# Patient Record
Sex: FEMALE | Race: WHITE | HISPANIC OR LATINO | Employment: STUDENT | RURAL
[De-identification: names, ages, dates, MRNs, and addresses within clinical notes are randomized per-mention and may not be internally consistent; named-entity substitution may affect disease eponyms.]

---
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
## 2024-06-29 PROCEDURE — 93005 ELECTROCARDIOGRAM TRACING: CPT | Performed by: EMERGENCY MEDICINE

## 2024-06-29 PROCEDURE — 99284 EMERGENCY DEPT VISIT MOD MDM: CPT

## 2024-06-29 PROCEDURE — 93005 ELECTROCARDIOGRAM TRACING: CPT

## 2024-06-30 ENCOUNTER — HOSPITAL ENCOUNTER (EMERGENCY)
Facility: HOSPITAL | Age: 19
Discharge: HOME | End: 2024-06-30
Attending: EMERGENCY MEDICINE | Admitting: EMERGENCY MEDICINE
Payer: MEDICAID

## 2024-06-30 ENCOUNTER — APPOINTMENT (OUTPATIENT)
Dept: RESPIRATORY THERAPY | Facility: HOSPITAL | Age: 19
End: 2024-06-30
Payer: MEDICAID

## 2024-06-30 ENCOUNTER — APPOINTMENT (OUTPATIENT)
Dept: GENERAL RADIOLOGY | Facility: HOSPITAL | Age: 19
End: 2024-06-30
Payer: MEDICAID

## 2024-06-30 VITALS
BODY MASS INDEX: 19.99 KG/M2 | SYSTOLIC BLOOD PRESSURE: 111 MMHG | RESPIRATION RATE: 24 BRPM | WEIGHT: 120 LBS | TEMPERATURE: 98.1 F | OXYGEN SATURATION: 96 % | HEIGHT: 65 IN | HEART RATE: 78 BPM | DIASTOLIC BLOOD PRESSURE: 72 MMHG

## 2024-06-30 DIAGNOSIS — R79.89 TSH ELEVATION: ICD-10-CM

## 2024-06-30 DIAGNOSIS — R00.2 PALPITATIONS: Primary | ICD-10-CM

## 2024-06-30 LAB
ALBUMIN SERPL-MCNC: 4.8 G/DL (ref 3.5–5.2)
ALBUMIN/GLOB SERPL: 1.7 G/DL
ALP SERPL-CCNC: 50 U/L (ref 39–117)
ALT SERPL W P-5'-P-CCNC: 19 U/L (ref 1–33)
ANION GAP SERPL CALCULATED.3IONS-SCNC: 13.2 MMOL/L (ref 5–15)
AST SERPL-CCNC: 24 U/L (ref 1–32)
BASOPHILS # BLD AUTO: 0.05 10*3/MM3 (ref 0–0.2)
BASOPHILS NFR BLD AUTO: 0.6 % (ref 0–1.5)
BILIRUB SERPL-MCNC: 0.4 MG/DL (ref 0–1.2)
BUN SERPL-MCNC: 13 MG/DL (ref 6–20)
BUN/CREAT SERPL: 18.3 (ref 7–25)
CALCIUM SPEC-SCNC: 9.9 MG/DL (ref 8.6–10.5)
CHLORIDE SERPL-SCNC: 104 MMOL/L (ref 98–107)
CO2 SERPL-SCNC: 21.8 MMOL/L (ref 22–29)
CREAT SERPL-MCNC: 0.71 MG/DL (ref 0.57–1)
DEPRECATED RDW RBC AUTO: 42.4 FL (ref 37–54)
EGFRCR SERPLBLD CKD-EPI 2021: 125.8 ML/MIN/1.73
EOSINOPHIL # BLD AUTO: 0.08 10*3/MM3 (ref 0–0.4)
EOSINOPHIL NFR BLD AUTO: 0.9 % (ref 0.3–6.2)
ERYTHROCYTE [DISTWIDTH] IN BLOOD BY AUTOMATED COUNT: 12.3 % (ref 12.3–15.4)
GLOBULIN UR ELPH-MCNC: 2.9 GM/DL
GLUCOSE SERPL-MCNC: 127 MG/DL (ref 65–99)
HCT VFR BLD AUTO: 38.7 % (ref 34–46.6)
HGB BLD-MCNC: 13.3 G/DL (ref 12–15.9)
HOLD SPECIMEN: NORMAL
HOLD SPECIMEN: NORMAL
IMM GRANULOCYTES # BLD AUTO: 0.02 10*3/MM3 (ref 0–0.05)
IMM GRANULOCYTES NFR BLD AUTO: 0.2 % (ref 0–0.5)
LYMPHOCYTES # BLD AUTO: 2.73 10*3/MM3 (ref 0.7–3.1)
LYMPHOCYTES NFR BLD AUTO: 32.2 % (ref 19.6–45.3)
MCH RBC QN AUTO: 31.9 PG (ref 26.6–33)
MCHC RBC AUTO-ENTMCNC: 34.4 G/DL (ref 31.5–35.7)
MCV RBC AUTO: 92.8 FL (ref 79–97)
MONOCYTES # BLD AUTO: 0.85 10*3/MM3 (ref 0.1–0.9)
MONOCYTES NFR BLD AUTO: 10 % (ref 5–12)
NEUTROPHILS NFR BLD AUTO: 4.75 10*3/MM3 (ref 1.7–7)
NEUTROPHILS NFR BLD AUTO: 56.1 % (ref 42.7–76)
NRBC BLD AUTO-RTO: 0 /100 WBC (ref 0–0.2)
PLATELET # BLD AUTO: 254 10*3/MM3 (ref 140–450)
PMV BLD AUTO: 10.2 FL (ref 6–12)
POTASSIUM SERPL-SCNC: 3.4 MMOL/L (ref 3.5–5.2)
PROT SERPL-MCNC: 7.7 G/DL (ref 6–8.5)
QT INTERVAL: 323 MS
QTC INTERVAL: 423 MS
RBC # BLD AUTO: 4.17 10*6/MM3 (ref 3.77–5.28)
SODIUM SERPL-SCNC: 139 MMOL/L (ref 136–145)
T4 FREE SERPL-MCNC: 1.17 NG/DL (ref 0.93–1.7)
TROPONIN T SERPL HS-MCNC: <6 NG/L
TSH SERPL DL<=0.05 MIU/L-ACNC: 5.91 UIU/ML (ref 0.27–4.2)
WBC NRBC COR # BLD AUTO: 8.48 10*3/MM3 (ref 3.4–10.8)
WHOLE BLOOD HOLD COAG: NORMAL
WHOLE BLOOD HOLD SPECIMEN: NORMAL

## 2024-06-30 PROCEDURE — 85025 COMPLETE CBC W/AUTO DIFF WBC: CPT | Performed by: NURSE PRACTITIONER

## 2024-06-30 PROCEDURE — 84484 ASSAY OF TROPONIN QUANT: CPT | Performed by: NURSE PRACTITIONER

## 2024-06-30 PROCEDURE — 80053 COMPREHEN METABOLIC PANEL: CPT | Performed by: NURSE PRACTITIONER

## 2024-06-30 PROCEDURE — 84443 ASSAY THYROID STIM HORMONE: CPT | Performed by: NURSE PRACTITIONER

## 2024-06-30 PROCEDURE — 84439 ASSAY OF FREE THYROXINE: CPT | Performed by: NURSE PRACTITIONER

## 2024-06-30 PROCEDURE — 71045 X-RAY EXAM CHEST 1 VIEW: CPT

## 2024-06-30 PROCEDURE — 93242 EXT ECG>48HR<7D RECORDING: CPT

## 2024-06-30 RX ORDER — SODIUM CHLORIDE 0.9 % (FLUSH) 0.9 %
10 SYRINGE (ML) INJECTION AS NEEDED
Status: DISCONTINUED | OUTPATIENT
Start: 2024-06-30 | End: 2024-06-30 | Stop reason: HOSPADM

## 2024-06-30 RX ORDER — POTASSIUM CHLORIDE 20 MEQ/1
20 TABLET, EXTENDED RELEASE ORAL ONCE
Status: COMPLETED | OUTPATIENT
Start: 2024-06-30 | End: 2024-06-30

## 2024-06-30 RX ADMIN — POTASSIUM CHLORIDE 20 MEQ: 1500 TABLET, EXTENDED RELEASE ORAL at 02:32

## 2024-06-30 NOTE — DISCHARGE INSTRUCTIONS
Today your TSH was 5.91   1-year ago is 0.76 this is a change in should be further evaluated by primary care.     Wear the Holter monitor and turn it in in 7 days as advised by cardiology    Follow-up with Dr. Muir for further evaluation and treatment and referral to cardiology

## 2024-06-30 NOTE — ED PROVIDER NOTES
Subjective   History of Present Illness  Is a 18-year-old female who states she has had palpitations on and off all day she states sometimes she has felt them in the past but today they were more prominent    States that when she feels them she gets very anxious and cannot sleep and that is why she is here in the middle of the night.    Denies any history of thyroid disorder but does have a history of anxiety which she is not treated for.      Review of Systems   Cardiovascular:  Positive for palpitations.   Psychiatric/Behavioral:  The patient is nervous/anxious.        No past medical history on file.    No Known Allergies    No past surgical history on file.    Family History   Problem Relation Age of Onset    Lymphoma Mother     Diabetes Maternal Grandmother     Hypertension Maternal Grandfather     Hypertension Paternal Grandfather        Social History     Socioeconomic History    Marital status: Single   Tobacco Use    Smoking status: Never     Passive exposure: Never    Smokeless tobacco: Never   Vaping Use    Vaping status: Never Used   Substance and Sexual Activity    Alcohol use: Never    Drug use: Never    Sexual activity: Defer           Objective   Physical Exam  Vitals reviewed.   Constitutional:       Appearance: Normal appearance. She is well-developed.   HENT:      Head: Normocephalic and atraumatic.   Eyes:      Conjunctiva/sclera: Conjunctivae normal.      Pupils: Pupils are equal, round, and reactive to light.   Cardiovascular:      Rate and Rhythm: Normal rate and regular rhythm.      Heart sounds: Normal heart sounds.   Pulmonary:      Effort: Pulmonary effort is normal. No respiratory distress.      Breath sounds: Normal breath sounds. No wheezing.   Abdominal:      General: Bowel sounds are normal.      Palpations: Abdomen is soft.      Tenderness: There is no abdominal tenderness.   Musculoskeletal:         General: No deformity. Normal range of motion.      Cervical back: Normal range of  "motion and neck supple.   Skin:     General: Skin is warm and dry.      Capillary Refill: Capillary refill takes less than 2 seconds.   Neurological:      General: No focal deficit present.      Mental Status: She is alert and oriented to person, place, and time.      GCS: GCS eye subscore is 4. GCS verbal subscore is 5. GCS motor subscore is 6.      Motor: No weakness.   Psychiatric:         Mood and Affect: Mood normal.         Behavior: Behavior normal.         Thought Content: Thought content normal.         Judgment: Judgment normal.         Procedures       EKG was interpreted by myself as well as Dr. Cabrera as sinus tach with a rate of 103 no ectopy no ST elevation no previous for comparison    ED Course                                   /72   Pulse 78   Temp 98.1 °F (36.7 °C) (Oral)   Resp 24   Ht 165.1 cm (65\")   Wt 54.4 kg (120 lb)   LMP 06/03/2024 (Approximate)   SpO2 96%   BMI 19.97 kg/m²   Labs Reviewed   COMPREHENSIVE METABOLIC PANEL - Abnormal; Notable for the following components:       Result Value    Glucose 127 (*)     Potassium 3.4 (*)     CO2 21.8 (*)     All other components within normal limits    Narrative:     GFR Normal >60  Chronic Kidney Disease <60  Kidney Failure <15     TSH - Abnormal; Notable for the following components:    TSH 5.910 (*)     All other components within normal limits   SINGLE HS TROPONIN T - Normal    Narrative:     High Sensitive Troponin T Reference Range:  <14.0 ng/L- Negative Female for AMI  <22.0 ng/L- Negative Male for AMI  >=14 - Abnormal Female indicating possible myocardial injury.  >=22 - Abnormal Male indicating possible myocardial injury.   Clinicians would have to utilize clinical acumen, EKG, Troponin, and serial changes to determine if it is an Acute Myocardial Infarction or myocardial injury due to an underlying chronic condition.        CBC WITH AUTO DIFFERENTIAL - Normal   T4, FREE - Normal   RAINBOW DRAW    Narrative:     The following " orders were created for panel order [place] Draw.  Procedure                               Abnormality         Status                     ---------                               -----------         ------                     Green Top (Gel)[[id number]]                                  Final result               Lavender Top[[id number]]                                     Final result               Gold Top - SST[[id number]]                                   Final result               Light Blue Top[[id number]]                                   Final result                 Please view results for these tests on the individual orders.   GREEN TOP   LAVENDER TOP   GOLD TOP - SST   LIGHT BLUE TOP   CBC AND DIFFERENTIAL    Narrative:     The following orders were created for panel order CBC & Differential.  Procedure                               Abnormality         Status                     ---------                               -----------         ------                     CBC Auto Differential[[id number]]        Normal              Final result                 Please view results for these tests on the individual orders.     Medications   sodium chloride 0.9 % flush 10 mL (has no administration in time range)   potassium chloride (KLOR-CON M20) CR tablet 20 mEq (20 mEq Oral Given 6/30/24 0232)     No radiology results for the last day            Medical Decision Making  Patient is a 18-year-old female who presents with palpitations that have been on and off all day.  IV was established and blood work was obtained she was found to have mild hypokalemia with a potassium of 3.4 she was given 20 mEq of K-Dur -the patient was found to have a TSH of 5.91 today where chart review showed comparison the 1 year ago was 0.64 this was discussed with Dr. Cabrera and the patient will have an additional  T4 ordered and was in normal limits-    She was discharged home with a Holter monitor she was instructed to return it as directed and  to follow-up with primary care for further evaluation of thyroid and further symptoms she verbalized understood discharge instructions as well as her parents at bedside    Problems Addressed:  Palpitations: complicated acute illness or injury  TSH elevation: complicated acute illness or injury    Amount and/or Complexity of Data Reviewed  Independent Historian: parent     Details: There and father at bedside  Labs: ordered.  Radiology: ordered.  ECG/medicine tests: ordered.    Risk  Prescription drug management.        Final diagnoses:   Palpitations   TSH elevation       ED Disposition  ED Disposition       ED Disposition   Discharge    Condition   Stable    Comment   --               Marlyn Miur MD  [address] [place] [address] DR ZUÑIGA  [address] [place] [facility]  [phone]    In 5 days  If symptoms worsen, As needed         Medication List      No changes were made to your prescriptions during this visit.            Jaylin Thomas, APRN  06/30/24 [id number]

## 2024-07-02 NOTE — PROGRESS NOTES
Chief Complaint  Palpitations and Anxiety    Subjective     CC  Problem List  Visit Diagnosis   Encounters  Notes  Medications  Labs  Result Review Imaging  Media    Giselle Brock presents to [facility] for   History of Present Illness  Giselle was seen at [facility]  on 06/30/2024. She was seen for Palpitations. Labs that were performed during the encounter included: Glucose 127, CO2 21.8, Potassium 3.4, TSH  5.9 . Diagnostic studies that were performed included: EKG-sinus tachycardia with heart rate of 103, no ectopy, no ST elevation. She was discharged home with a holter monitor and has been referred to cardiology. Medication changes: none .   Palpitations   This is a chronic problem. The current episode started more than 1 year ago. The problem occurs intermittently. The problem has been waxing and waning. Nothing aggravates the symptoms. Associated symptoms include anxiety, an irregular heartbeat and shortness of breath. Pertinent negatives include no chest fullness, chest pain, dizziness, fever, malaise/fatigue, nausea, near-syncope, vomiting or weakness. She has tried nothing for the symptoms. The treatment provided mild relief. There are no known risk factors. Her past medical history is significant for anxiety.   Anxiety  Presents for initial visit. Onset was 1 to 6 months ago.  Symptoms include excessive worry, hyperventilation, insomnia, nervous/anxious behavior, palpitations, panic, restlessness and shortness of breath.  Patient reports no chest pain, depressed mood, dizziness, irritability, nausea or malaise/fatigue. Symptoms occur constantly. The severity of symptoms is moderate. The patient sleeps 6 hours per night. The quality of sleep is poor (Has trouble falling asleep). Awakens seldom during the night. Her past medical history is significant for anxiety/panic attacks. Past treatments include nothing.     Review of Systems   Constitutional:   "Negative for appetite change, fever, irritability, malaise/fatigue, unexpected weight gain and unexpected weight loss.   Eyes:  Negative for visual disturbance.   Respiratory:  Positive for shortness of breath.    Cardiovascular:  Positive for palpitations. Negative for chest pain, leg swelling and near-syncope.   Gastrointestinal:  Negative for abdominal pain, diarrhea, nausea and vomiting.   Endocrine: Negative for cold intolerance, heat intolerance, polydipsia and polyuria.   Skin:  Negative for rash.   Neurological:  Negative for dizziness and weakness.   Hematological:  Negative for adenopathy. Does not bruise/bleed easily.   Psychiatric/Behavioral:  Negative for depressed mood. The patient is nervous/anxious and has insomnia.         Objective   Vital Signs:   /76 (BP Location: Right arm, Patient Position: Sitting, Cuff Size: Adult)   Pulse 104   Temp 98.4 °F (36.9 °C) (Temporal)   Resp 18   Ht 165.1 cm (65\")   Wt 51.7 kg (114 lb)   SpO2 100%   BMI 18.97 kg/m²     Physical Exam  Constitutional:       General: She is not in acute distress.  Cardiovascular:      Rate and Rhythm: Normal rate and regular rhythm.      Pulses:           Dorsalis pedis pulses are 2+ on the right side and 2+ on the left side.      Heart sounds: No murmur heard.  Pulmonary:      Effort: Pulmonary effort is normal.      Breath sounds: Normal breath sounds. No wheezing.   Musculoskeletal:      Cervical back: Neck supple.      Right lower leg: No edema.      Left lower leg: No edema.   Lymphadenopathy:      Cervical: No cervical adenopathy.   Skin:     Findings: No rash.   Neurological:      Mental Status: She is alert.        Result Review :Labs  Result Review  Imaging  Med Tab  Media                 Assessment and Plan CC Problem List  Visit Diagnosis  ROS  Review (Popup)  Health Maintenance  Quality  BestPractice  Medications  SmartSets  SnapShot Encounters  Media  Problem List Items Addressed This Visit  "         Unprioritized    Anxiety    Overview     Sxs controlled with diet and exercise.           Other Visit Diagnoses       Palpitation    -  Primary    neg exam, neg ECG ED,  reassuring sxs, lab r.o pathology. Notify of results. monitor and follow            Follow Up Instructions Charge Capture  Follow-up Communications  Return if symptoms worsen or fail to improve.  Patient was given instructions and counseling regarding her condition or for health maintenance advice. Please see specific information pulled into the AVS if appropriate.   Answers submitted by the patient for this visit:  Other (Submitted on 7/3/2024)  Please describe your symptoms.: Heart palpitations and irregular heartbeat  Have you had these symptoms before?: Yes  How long have you been having these symptoms?: Greater than 2 weeks  Please list any medications you are currently taking for this condition.: None  Please describe any probable cause for these symptoms. : Hypothyroidism/low potassium  Primary Reason for Visit (Submitted on 7/3/2024)  What is the primary reason for your visit?: Other

## 2024-07-03 ENCOUNTER — OFFICE VISIT (OUTPATIENT)
Dept: FAMILY MEDICINE CLINIC | Facility: CLINIC | Age: 19
End: 2024-07-03

## 2024-07-03 VITALS
OXYGEN SATURATION: 100 % | RESPIRATION RATE: 18 BRPM | SYSTOLIC BLOOD PRESSURE: 120 MMHG | HEART RATE: 104 BPM | WEIGHT: 114 LBS | DIASTOLIC BLOOD PRESSURE: 76 MMHG | TEMPERATURE: 98.4 F | HEIGHT: 65 IN | BODY MASS INDEX: 18.99 KG/M2

## 2024-07-03 DIAGNOSIS — R00.2 PALPITATION: Primary | ICD-10-CM

## 2024-07-03 DIAGNOSIS — F41.9 ANXIETY: ICD-10-CM

## 2024-07-03 PROCEDURE — 99214 OFFICE O/P EST MOD 30 MIN: CPT | Performed by: FAMILY MEDICINE

## 2025-04-09 ENCOUNTER — TELEPHONE (OUTPATIENT)
Dept: FAMILY MEDICINE CLINIC | Facility: CLINIC | Age: 20
End: 2025-04-09

## 2025-04-09 NOTE — TELEPHONE ENCOUNTER
Called and LMOM for the patient as we do not have them scheduled and or have an upcoming appointment with new provider.